# Patient Record
Sex: FEMALE | Race: WHITE | NOT HISPANIC OR LATINO | Employment: UNEMPLOYED | URBAN - METROPOLITAN AREA
[De-identification: names, ages, dates, MRNs, and addresses within clinical notes are randomized per-mention and may not be internally consistent; named-entity substitution may affect disease eponyms.]

---

## 2017-07-31 ENCOUNTER — TELEPHONE (OUTPATIENT)
Dept: OBSTETRICS AND GYNECOLOGY | Facility: CLINIC | Age: 32
End: 2017-07-31

## 2017-07-31 NOTE — TELEPHONE ENCOUNTER
----- Message from [place] sent at 7/31/2017 12:13 PM CDT -----  Contact: [phone]  Patient has medicaid and would like to see dr. Gilbert for a well woman visit

## 2017-07-31 NOTE — TELEPHONE ENCOUNTER
Pt notified that Dr. Gilbert does not see patients who are not pregnant that have medicaid. Pt has been scheduled with Dr. Olsen

## 2017-08-11 ENCOUNTER — TELEPHONE (OUTPATIENT)
Dept: OBSTETRICS AND GYNECOLOGY | Facility: CLINIC | Age: 32
End: 2017-08-11

## 2017-08-11 NOTE — TELEPHONE ENCOUNTER
----- Message from Priscilla Domínguez MA sent at 8/11/2017  9:14 AM CDT -----  Contact: [phone]/ self       ----- Message -----  From: Freda Luz  Sent: 8/11/2017   9:08 AM  To: [facility] Staff    Patient called in returning your call. Please advise

## 2019-04-10 ENCOUNTER — HOSPITAL ENCOUNTER (EMERGENCY)
Facility: HOSPITAL | Age: 34
Discharge: HOME | End: 2019-04-10
Attending: EMERGENCY MEDICINE
Payer: COMMERCIAL

## 2019-04-10 ENCOUNTER — TELEPHONE (OUTPATIENT)
Dept: ORTHOPEDICS | Facility: CLINIC | Age: 34
End: 2019-04-10

## 2019-04-10 VITALS
HEART RATE: 86 BPM | WEIGHT: 140 LBS | BODY MASS INDEX: 25.76 KG/M2 | HEIGHT: 62 IN | TEMPERATURE: 98 F | SYSTOLIC BLOOD PRESSURE: 108 MMHG | OXYGEN SATURATION: 98 % | RESPIRATION RATE: 16 BRPM | DIASTOLIC BLOOD PRESSURE: 52 MMHG

## 2019-04-10 DIAGNOSIS — M71.122 SEPTIC OLECRANON BURSITIS OF LEFT ELBOW: Primary | ICD-10-CM

## 2019-04-10 DIAGNOSIS — Z72.0 TOBACCO USE: ICD-10-CM

## 2019-04-10 DIAGNOSIS — M25.522 ELBOW PAIN, LEFT: ICD-10-CM

## 2019-04-10 LAB
ANION GAP SERPL CALC-SCNC: 8 MMOL/L (ref 8–16)
BASOPHILS # BLD AUTO: 0.07 K/UL (ref 0–0.2)
BASOPHILS NFR BLD: 0.6 % (ref 0–1.9)
BUN SERPL-MCNC: 12 MG/DL (ref 6–20)
CALCIUM SERPL-MCNC: 9.4 MG/DL (ref 8.7–10.5)
CHLORIDE SERPL-SCNC: 109 MMOL/L (ref 95–110)
CO2 SERPL-SCNC: 21 MMOL/L (ref 23–29)
CREAT SERPL-MCNC: 0.7 MG/DL (ref 0.5–1.4)
CRP SERPL-MCNC: 2.3 MG/L (ref 0–8.2)
DIFFERENTIAL METHOD: ABNORMAL
EOSINOPHIL # BLD AUTO: 0.1 K/UL (ref 0–0.5)
EOSINOPHIL NFR BLD: 0.6 % (ref 0–8)
ERYTHROCYTE [DISTWIDTH] IN BLOOD BY AUTOMATED COUNT: 13.2 % (ref 11.5–14.5)
ERYTHROCYTE [SEDIMENTATION RATE] IN BLOOD BY WESTERGREN METHOD: 5 MM/HR (ref 0–36)
EST. GFR  (AFRICAN AMERICAN): >60 ML/MIN/1.73 M^2
EST. GFR  (NON AFRICAN AMERICAN): >60 ML/MIN/1.73 M^2
GLUCOSE SERPL-MCNC: 92 MG/DL (ref 70–110)
HCT VFR BLD AUTO: 41.2 % (ref 37–48.5)
HGB BLD-MCNC: 13.5 G/DL (ref 12–16)
IMM GRANULOCYTES # BLD AUTO: 0.06 K/UL (ref 0–0.04)
IMM GRANULOCYTES NFR BLD AUTO: 0.5 % (ref 0–0.5)
LYMPHOCYTES # BLD AUTO: 3.1 K/UL (ref 1–4.8)
LYMPHOCYTES NFR BLD: 24.8 % (ref 18–48)
MCH RBC QN AUTO: 29.9 PG (ref 27–31)
MCHC RBC AUTO-ENTMCNC: 32.8 G/DL (ref 32–36)
MCV RBC AUTO: 91 FL (ref 82–98)
MONOCYTES # BLD AUTO: 1 K/UL (ref 0.3–1)
MONOCYTES NFR BLD: 8.1 % (ref 4–15)
NEUTROPHILS # BLD AUTO: 8.2 K/UL (ref 1.8–7.7)
NEUTROPHILS NFR BLD: 65.4 % (ref 38–73)
NRBC BLD-RTO: 0 /100 WBC
PLATELET # BLD AUTO: 318 K/UL (ref 150–350)
PMV BLD AUTO: 11.3 FL (ref 9.2–12.9)
POTASSIUM SERPL-SCNC: 3.6 MMOL/L (ref 3.5–5.1)
RBC # BLD AUTO: 4.52 M/UL (ref 4–5.4)
SODIUM SERPL-SCNC: 138 MMOL/L (ref 136–145)
WBC # BLD AUTO: 12.51 K/UL (ref 3.9–12.7)

## 2019-04-10 PROCEDURE — 99284 EMERGENCY DEPT VISIT MOD MDM: CPT | Mod: 25,,, | Performed by: PHYSICIAN ASSISTANT

## 2019-04-10 PROCEDURE — 80048 BASIC METABOLIC PNL TOTAL CA: CPT

## 2019-04-10 PROCEDURE — 85652 RBC SED RATE AUTOMATED: CPT

## 2019-04-10 PROCEDURE — 86140 C-REACTIVE PROTEIN: CPT

## 2019-04-10 PROCEDURE — 99284 PR EMERGENCY DEPT VISIT,LEVEL IV: ICD-10-PCS | Mod: 25,,, | Performed by: PHYSICIAN ASSISTANT

## 2019-04-10 PROCEDURE — 99406 PR TOBACCO USE CESSATION INTERMEDIATE 3-10 MINUTES: ICD-10-PCS | Mod: ,,, | Performed by: PHYSICIAN ASSISTANT

## 2019-04-10 PROCEDURE — 99284 EMERGENCY DEPT VISIT MOD MDM: CPT

## 2019-04-10 PROCEDURE — 85025 COMPLETE CBC W/AUTO DIFF WBC: CPT

## 2019-04-10 PROCEDURE — 99406 BEHAV CHNG SMOKING 3-10 MIN: CPT | Mod: ,,, | Performed by: PHYSICIAN ASSISTANT

## 2019-04-10 RX ORDER — CLINDAMYCIN HYDROCHLORIDE 150 MG/1
300 CAPSULE ORAL
Status: DISCONTINUED | OUTPATIENT
Start: 2019-04-10 | End: 2019-04-10 | Stop reason: HOSPADM

## 2019-04-10 RX ORDER — KETOROLAC TROMETHAMINE 10 MG/1
10 TABLET, FILM COATED ORAL
Status: DISCONTINUED | OUTPATIENT
Start: 2019-04-10 | End: 2019-04-10 | Stop reason: HOSPADM

## 2019-04-10 RX ORDER — NABUMETONE 500 MG/1
500 TABLET, FILM COATED ORAL 2 TIMES DAILY PRN
Qty: 14 TABLET | Refills: 0 | Status: SHIPPED | OUTPATIENT
Start: 2019-04-10 | End: 2019-04-17

## 2019-04-10 RX ORDER — CLINDAMYCIN HYDROCHLORIDE 300 MG/1
300 CAPSULE ORAL EVERY 8 HOURS
Qty: 21 CAPSULE | Refills: 0 | Status: SHIPPED | OUTPATIENT
Start: 2019-04-10

## 2019-04-10 NOTE — ED TRIAGE NOTES
Ivania King, a 33 y.o. female presents to the ED w/ complaint of left elbow pain. Left elbow is warm, red and edematous. Pt denies any recent trauma. Pt denies any fever.     Triage note:  Chief Complaint   Patient presents with    Elbow Pain     Presents to ED c/o redness, swelling, and pain to L elbow that she first noticed this morning. Denies known injury      Review of patient's allergies indicates:  No Known Allergies  No past medical history on file.

## 2019-04-10 NOTE — TELEPHONE ENCOUNTER
Left a voicemail for patient to return my phone call in regards to scheduling an appointment at their convenience. Provided the clinic's phone number.

## 2019-04-10 NOTE — ED NOTES
"Pt SO presents to nurses station wanted to go eat with pt in cafe.  I advised we do not like pt's to leave the area, althought they have already done that to go smoke.  Pt SO other states "well I don't want her to leave and she will".  I report that I cannot make her stay she is an adult and can decide that for herself.  He states "we are going to the bathroom, I'll let you know when we get back"  "

## 2019-04-10 NOTE — ED PROVIDER NOTES
Encounter Date: 4/10/2019       History     Chief Complaint   Patient presents with    Elbow Pain     Presents to ED c/o redness, swelling, and pain to L elbow that she first noticed this morning. Denies known injury      The patient presents to the ER for an emergent evaluation due to acute pain, redness, and swelling to the posterior aspect of her left elbow. She states that the pain began gradually last night, and this morning she discovered the redness and swelling. She denies any known injury or specific trauma to the elbow. She denies any decreased ROM of the elbow. She is right handed. 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 She denies any known break in the skin. She denies previous episodes. She states that the degree of pain is moderate. She states that the pain course is constant. She states that her posterior elbow is very tender to palpation and worse with full flexion. She denies any fever or chills. She denies any additional symptoms or concerns. No pre-arrival treatment.         Review of patient's allergies indicates:  No Known Allergies  History reviewed. No pertinent past medical history.  Past Surgical History:   Procedure Laterality Date    ANKLE SURGERY      SKIN GRAFT      on leg    SKIN GRAPH ON LEG      TUBAL LIGATION       History reviewed. No pertinent family history.  Social History     Tobacco Use    Smoking status: Current Every Day Smoker     Packs/day: 1.00    Smokeless tobacco: Never Used   Substance Use Topics    Alcohol use: No    Drug use: No     Review of Systems   Constitutional: Negative for chills and fever.   Respiratory: Negative for shortness of breath.    Cardiovascular: Negative for chest pain.   Gastrointestinal: Negative for abdominal pain, diarrhea, nausea and vomiting.   Endocrine: Negative for polydipsia and polyuria.   Genitourinary: Negative for menstrual problem.   Musculoskeletal:  Positive for arthralgias and joint swelling. Negative for back pain and myalgias.   Skin: Positive for color change. Negative for rash and wound.   Allergic/Immunologic: Negative for immunocompromised state.   Neurological: Negative for dizziness, syncope, weakness, light-headedness and headaches.   Psychiatric/Behavioral: Negative for confusion.       Physical Exam     Initial Vitals [04/10/19 0619]   BP Pulse Resp Temp SpO2   113/66 80 16 98.3 °F (36.8 °C) 98 %      MAP       --         Physical Exam    Nursing note and vitals reviewed.  Constitutional: She appears well-developed and well-nourished. She is not diaphoretic.   HENT:   Head: Normocephalic.   Mouth/Throat: Oropharynx is clear and moist.   Eyes: Conjunctivae are normal.   Cardiovascular: Normal rate, regular rhythm and intact distal pulses.   Pulmonary/Chest: Breath sounds normal. No respiratory distress.   Abdominal: Soft. There is no tenderness.   Musculoskeletal: Normal range of motion.   Neurological: She is alert and oriented to person, place, and time. She has normal strength. No sensory deficit.   Skin: Skin is warm and dry.   Psychiatric: She has a normal mood and affect. Her behavior is normal.             ED Course   Procedures  Labs Reviewed   CBC W/ AUTO DIFFERENTIAL - Abnormal; Notable for the following components:       Result Value    Gran # (ANC) 8.2 (*)     Immature Grans (Abs) 0.06 (*)     All other components within normal limits   BASIC METABOLIC PANEL - Abnormal; Notable for the following components:    CO2 21 (*)     All other components within normal limits   SEDIMENTATION RATE   C-REACTIVE PROTEIN     Results for orders placed or performed during the hospital encounter of 04/10/19   CBC auto differential   Result Value Ref Range    WBC 12.51 3.90 - 12.70 K/uL    RBC 4.52 4.00 - 5.40 M/uL    Hemoglobin 13.5 12.0 - 16.0 g/dL    Hematocrit 41.2 37.0 - 48.5 %    MCV 91 82 - 98 fL    MCH 29.9 27.0 - 31.0 pg    MCHC 32.8 32.0 - 36.0  g/dL    RDW 13.2 11.5 - 14.5 %    Platelets 318 150 - 350 K/uL    MPV 11.3 9.2 - 12.9 fL    Immature Granulocytes 0.5 0.0 - 0.5 %    Gran # (ANC) 8.2 (H) 1.8 - 7.7 K/uL    Immature Grans (Abs) 0.06 (H) 0.00 - 0.04 K/uL    Lymph # 3.1 1.0 - 4.8 K/uL    Mono # 1.0 0.3 - 1.0 K/uL    Eos # 0.1 0.0 - 0.5 K/uL    Baso # 0.07 0.00 - 0.20 K/uL    nRBC 0 0 /100 WBC    Gran% 65.4 38.0 - 73.0 %    Lymph% 24.8 18.0 - 48.0 %    Mono% 8.1 4.0 - 15.0 %    Eosinophil% 0.6 0.0 - 8.0 %    Basophil% 0.6 0.0 - 1.9 %    Differential Method Automated    Basic metabolic panel   Result Value Ref Range    Sodium 138 136 - 145 mmol/L    Potassium 3.6 3.5 - 5.1 mmol/L    Chloride 109 95 - 110 mmol/L    CO2 21 (L) 23 - 29 mmol/L    Glucose 92 70 - 110 mg/dL    BUN, Bld 12 6 - 20 mg/dL    Creatinine 0.7 0.5 - 1.4 mg/dL    Calcium 9.4 8.7 - 10.5 mg/dL    Anion Gap 8 8 - 16 mmol/L    eGFR if African American >60.0 >60 mL/min/1.73 m^2    eGFR if non African American >60.0 >60 mL/min/1.73 m^2   Sedimentation rate   Result Value Ref Range    Sed Rate 5 0 - 36 mm/Hr   C-reactive protein   Result Value Ref Range    CRP 2.3 0.0 - 8.2 mg/L            Imaging Results          X-Ray Elbow Complete Left (Final result)  Result time 04/10/19 08:31:55    Final result by Bienvenido Garrett MD (04/10/19 08:31:55)                 Impression:      See above      Electronically signed by: Bienvenido Garrett MD  Date:    04/10/2019  Time:    08:31             Narrative:    EXAMINATION:  XR ELBOW COMPLETE 3 VIEW LEFT    CLINICAL HISTORY:  Pain in left elbow    TECHNIQUE:  AP, lateral, and oblique views of the left elbow were performed.    COMPARISON:  None    FINDINGS:  No fracture or dislocation.  No bone destruction identified                                 Medical Decision Making:   History:   Old Medical Records: I decided to obtain old medical records.  Initial Assessment:   Acute pain, swelling, and erythema to posterior aspect of left elbow.   Differential Diagnosis:    Bursitis, septic bursitis, abscess, gout, septic joint, effusion, etc   Clinical Tests:   Lab Tests: Ordered and Reviewed  Radiological Study: Ordered and Reviewed  ED Management:  I discussed the case in detail with the ER attending physician   I discussed the case in detail with orthopedics, to determine indication for emergent aspiration or drainage of infected bursa. Ortho recommended antibiotic treatment w/o drainage initially and if unimproved, to consider drainage.   Clindamycin given in the ER and prescribed.   Pt advised to follow up with orthopedics   Pt advised to return to the ER if worse in any way     Additional MDM:   Smoking Cessation: The patient is a smoker. The patient was counseled on smoking cessation for: 3 minutes. The patient was counseled on tobacco related  health complications. Appropriate patient literature was given to the patient concerning tobacco cessation.   X-Rays: I have independently interpreted X-Ray(s) - see notes.            Attending Attestation:     Physician Attestation Statement for NP/PA:       Other NP/PA Attestation Additions:    History of Present Illness: 32 y/o f complains left elbow swelling, pain and redness.                      Clinical Impression:       ICD-10-CM ICD-9-CM   1. Septic olecranon bursitis of left elbow M71.122 726.33     041.9   2. Elbow pain, left M25.522 719.42   3. Tobacco use Z72.0 305.1         Disposition:   Disposition: Discharged  Condition: Stable                        Jean Pierre Ulloa PA-C  04/10/19 0857

## 2019-04-11 ENCOUNTER — TELEPHONE (OUTPATIENT)
Dept: ORTHOPEDICS | Facility: CLINIC | Age: 34
End: 2019-04-11

## 2019-04-11 NOTE — TELEPHONE ENCOUNTER
Spoke pt and informed her I would need to schedule her to go to [facility] due to them specilazing in the upper extremities (her elbow), pt understood and accepted the appt on 4-17-19 at 1pm.

## 2019-04-16 ENCOUNTER — TELEPHONE (OUTPATIENT)
Dept: ORTHOPEDICS | Facility: CLINIC | Age: 34
End: 2019-04-16

## 2019-04-28 ENCOUNTER — HOSPITAL ENCOUNTER (EMERGENCY)
Facility: HOSPITAL | Age: 34
Discharge: HOME | End: 2019-04-29
Attending: EMERGENCY MEDICINE
Payer: COMMERCIAL

## 2019-04-28 DIAGNOSIS — N23 RENAL COLIC ON LEFT SIDE: Primary | ICD-10-CM

## 2019-04-28 LAB
B-HCG UR QL: NEGATIVE
BASOPHILS # BLD AUTO: 0.03 K/UL (ref 0–0.2)
BASOPHILS NFR BLD: 0.2 % (ref 0–1.9)
CTP QC/QA: YES
DIFFERENTIAL METHOD: ABNORMAL
EOSINOPHIL # BLD AUTO: 0 K/UL (ref 0–0.5)
EOSINOPHIL NFR BLD: 0.2 % (ref 0–8)
ERYTHROCYTE [DISTWIDTH] IN BLOOD BY AUTOMATED COUNT: 13.1 % (ref 11.5–14.5)
HCT VFR BLD AUTO: 43.3 % (ref 37–48.5)
HGB BLD-MCNC: 14.1 G/DL (ref 12–16)
LYMPHOCYTES # BLD AUTO: 3.1 K/UL (ref 1–4.8)
LYMPHOCYTES NFR BLD: 19.5 % (ref 18–48)
MCH RBC QN AUTO: 29.7 PG (ref 27–31)
MCHC RBC AUTO-ENTMCNC: 32.6 G/DL (ref 32–36)
MCV RBC AUTO: 91 FL (ref 82–98)
MONOCYTES # BLD AUTO: 1.1 K/UL (ref 0.3–1)
MONOCYTES NFR BLD: 6.7 % (ref 4–15)
NEUTROPHILS # BLD AUTO: 11.7 K/UL (ref 1.8–7.7)
NEUTROPHILS NFR BLD: 73.2 % (ref 38–73)
PLATELET # BLD AUTO: 381 K/UL (ref 150–350)
PMV BLD AUTO: 10.3 FL (ref 9.2–12.9)
RBC # BLD AUTO: 4.74 M/UL (ref 4–5.4)
WBC # BLD AUTO: 16.01 K/UL (ref 3.9–12.7)

## 2019-04-28 PROCEDURE — 81000 URINALYSIS NONAUTO W/SCOPE: CPT

## 2019-04-28 PROCEDURE — 63600175 PHARM REV CODE 636 W HCPCS: Performed by: EMERGENCY MEDICINE

## 2019-04-28 PROCEDURE — 25000003 PHARM REV CODE 250: Performed by: EMERGENCY MEDICINE

## 2019-04-28 PROCEDURE — 96361 HYDRATE IV INFUSION ADD-ON: CPT

## 2019-04-28 PROCEDURE — 99285 EMERGENCY DEPT VISIT HI MDM: CPT | Mod: 25

## 2019-04-28 PROCEDURE — 96374 THER/PROPH/DIAG INJ IV PUSH: CPT

## 2019-04-28 PROCEDURE — 85025 COMPLETE CBC W/AUTO DIFF WBC: CPT

## 2019-04-28 PROCEDURE — 81025 URINE PREGNANCY TEST: CPT | Performed by: EMERGENCY MEDICINE

## 2019-04-28 PROCEDURE — 80053 COMPREHEN METABOLIC PANEL: CPT

## 2019-04-28 PROCEDURE — 96375 TX/PRO/DX INJ NEW DRUG ADDON: CPT

## 2019-04-28 RX ORDER — ONDANSETRON 2 MG/ML
4 INJECTION INTRAMUSCULAR; INTRAVENOUS
Status: COMPLETED | OUTPATIENT
Start: 2019-04-28 | End: 2019-04-28

## 2019-04-28 RX ORDER — KETOROLAC TROMETHAMINE 30 MG/ML
10 INJECTION, SOLUTION INTRAMUSCULAR; INTRAVENOUS
Status: COMPLETED | OUTPATIENT
Start: 2019-04-28 | End: 2019-04-28

## 2019-04-28 RX ADMIN — SODIUM CHLORIDE 1000 ML: 0.9 INJECTION, SOLUTION INTRAVENOUS at 11:04

## 2019-04-28 RX ADMIN — ONDANSETRON 4 MG: 2 INJECTION INTRAMUSCULAR; INTRAVENOUS at 11:04

## 2019-04-28 RX ADMIN — KETOROLAC TROMETHAMINE 10 MG: 30 INJECTION, SOLUTION INTRAMUSCULAR at 11:04

## 2019-04-29 VITALS
SYSTOLIC BLOOD PRESSURE: 96 MMHG | OXYGEN SATURATION: 98 % | WEIGHT: 140 LBS | BODY MASS INDEX: 25.76 KG/M2 | TEMPERATURE: 98 F | HEIGHT: 62 IN | HEART RATE: 69 BPM | DIASTOLIC BLOOD PRESSURE: 51 MMHG | RESPIRATION RATE: 14 BRPM

## 2019-04-29 LAB
ALBUMIN SERPL BCP-MCNC: 4.1 G/DL (ref 3.5–5.2)
ALP SERPL-CCNC: 74 U/L (ref 55–135)
ALT SERPL W/O P-5'-P-CCNC: 18 U/L (ref 10–44)
ANION GAP SERPL CALC-SCNC: 11 MMOL/L (ref 8–16)
AST SERPL-CCNC: 16 U/L (ref 10–40)
BACTERIA #/AREA URNS HPF: ABNORMAL /HPF
BILIRUB SERPL-MCNC: 0.2 MG/DL (ref 0.1–1)
BILIRUB UR QL STRIP: NEGATIVE
BUN SERPL-MCNC: 18 MG/DL (ref 6–20)
CALCIUM SERPL-MCNC: 9.8 MG/DL (ref 8.7–10.5)
CHLORIDE SERPL-SCNC: 107 MMOL/L (ref 95–110)
CLARITY UR: ABNORMAL
CO2 SERPL-SCNC: 23 MMOL/L (ref 23–29)
COLOR UR: YELLOW
CREAT SERPL-MCNC: 0.9 MG/DL (ref 0.5–1.4)
EST. GFR  (AFRICAN AMERICAN): >60 ML/MIN/1.73 M^2
EST. GFR  (NON AFRICAN AMERICAN): >60 ML/MIN/1.73 M^2
GLUCOSE SERPL-MCNC: 135 MG/DL (ref 70–110)
GLUCOSE UR QL STRIP: NEGATIVE
GRAN CASTS #/AREA URNS LPF: 1 /LPF
HGB UR QL STRIP: ABNORMAL
KETONES UR QL STRIP: ABNORMAL
LEUKOCYTE ESTERASE UR QL STRIP: ABNORMAL
MICROSCOPIC COMMENT: ABNORMAL
NITRITE UR QL STRIP: NEGATIVE
PH UR STRIP: 6 [PH] (ref 5–8)
POTASSIUM SERPL-SCNC: 3.5 MMOL/L (ref 3.5–5.1)
PROT SERPL-MCNC: 7.6 G/DL (ref 6–8.4)
PROT UR QL STRIP: NEGATIVE
RBC #/AREA URNS HPF: >100 /HPF (ref 0–4)
SODIUM SERPL-SCNC: 141 MMOL/L (ref 136–145)
SP GR UR STRIP: >=1.03 (ref 1–1.03)
SQUAMOUS #/AREA URNS HPF: 4 /HPF
URN SPEC COLLECT METH UR: ABNORMAL
UROBILINOGEN UR STRIP-ACNC: NEGATIVE EU/DL
WBC #/AREA URNS HPF: 4 /HPF (ref 0–5)

## 2019-04-29 NOTE — ED NOTES
Patient has L sided flank pain that started two hours ago. Patient complains of trouble peeing, burning with urination, L flank pain, nausea, and pressure in her rectum and vagina.

## 2019-04-29 NOTE — ED PROVIDER NOTES
Encounter Date: 4/28/2019    SCRIBE #1 NOTE: I, Gautam Elisa, am scribing for, and in the presence of,  Dr. Cabezas. I have scribed the entire note.       History     Chief Complaint   Patient presents with    Flank Pain     left flank pain that radiates down to perineal area. Reports unable to urinate, last urination at 8pm. pt restless.      Time seen by provider: 11:11 PM    This is a 33 y.o. female who presents with complaint of left flank pain x 2 hours. Patient states that her pain radiates down to the perineal area, and she has been unable to urinate since 2000. She reports associated nausea and vomiting. On arrival, the patient is moaning in pain and appears in distress. Patient does not report any other concerning symptoms. She has no prior history of similar symptoms or kidney stones. Patient denies any prior history of HTN, DM, or other medical issues.    The history is provided by the patient.     Review of patient's allergies indicates:  No Known Allergies  No past medical history on file.  Past Surgical History:   Procedure Laterality Date    ANKLE SURGERY      SKIN GRAFT      on leg    SKIN GRAPH ON LEG      TUBAL LIGATION       No family history on file.  Social History     Tobacco Use    Smoking status: Current Every Day Smoker     Packs/day: 1.00    Smokeless tobacco: Never Used   Substance Use Topics    Alcohol use: No    Drug use: No     Review of Systems   Gastrointestinal: Positive for nausea and vomiting.   Genitourinary: Positive for flank pain.   All other systems reviewed and are negative.    Physical Exam     Initial Vitals [04/28/19 2308]   BP Pulse Resp Temp SpO2   122/64 99 (!) 24 97.7 °F (36.5 °C) 100 %      MAP       --         Physical Exam    Nursing note and vitals reviewed.  Constitutional: She appears well-developed and well-nourished. She is not diaphoretic. She appears distressed.   Distressed, in obvious pain  Crying   HENT:   Head: Normocephalic and atraumatic.   Eyes:  Pupils are equal, round, and reactive to light.   Cardiovascular: Normal rate, regular rhythm and normal heart sounds.   Pulmonary/Chest: Breath sounds normal. No respiratory distress.   Abdominal: Soft. There is no tenderness.   Genitourinary:   Genitourinary Comments:  exam deferred   Musculoskeletal: Normal range of motion. She exhibits no edema or tenderness.   No CVA TTP   Neurological: She is alert and oriented to person, place, and time. She has normal strength.   Skin: Skin is warm and dry. No rash noted.   No rash       ED Course   Procedures  Labs Reviewed   CBC W/ AUTO DIFFERENTIAL - Abnormal; Notable for the following components:       Result Value    WBC 16.01 (*)     Platelets 381 (*)     Gran # (ANC) 11.7 (*)     Mono # 1.1 (*)     Gran% 73.2 (*)     All other components within normal limits   COMPREHENSIVE METABOLIC PANEL - Abnormal; Notable for the following components:    Glucose 135 (*)     All other components within normal limits   URINALYSIS, REFLEX TO URINE CULTURE - Abnormal; Notable for the following components:    Appearance, UA Hazy (*)     Specific Gravity, UA >=1.030 (*)     Ketones, UA 1+ (*)     Occult Blood UA 3+ (*)     Leukocytes, UA Trace (*)     All other components within normal limits    Narrative:     Preferred Collection Type->Urine, Clean Catch   URINALYSIS MICROSCOPIC - Abnormal; Notable for the following components:    RBC, UA >100 (*)     Bacteria, UA Few (*)     Granular Casts, UA 1 (*)     All other components within normal limits    Narrative:     Preferred Collection Type->Urine, Clean Catch   POCT URINE PREGNANCY          X-Rays:   Independently Interpreted Readings:   Other Readings:  Reviewed by myself, read by radiology.    Imaging Results          CT Renal Stone Study ABD Pelvis WO (Final result)  Result time 04/28/19 23:56:03    Final result by Kendall Blakely MD (04/28/19 23:56:03)                 Impression:      Moderate left-sided hydroureteronephrosis with no  identifiable stone along the course of the left ureter.  The findings may represent recently passed stone in the left collecting system.  No evidence of nephrolithiasis.    Scattered colonic diverticula without evidence of acute diverticulitis.    Abnormal trabeculation involving the left iliac bone.  Suggest correlation with prior examinations.      Electronically signed by: Kendall Blakely MD  Date:    04/28/2019  Time:    23:56             Narrative:    EXAMINATION:  CT RENAL STONE STUDY ABD PELVIS WO    CLINICAL HISTORY:  Flank pain, stone disease suspected;    TECHNIQUE:  Axial CT scan of the abdomen and pelvis was obtained from the level of the hemidiaphragms to the pubic symphysis without intravenous contrast. Coronal and sagittal reformats were obtained. The study was performed using a renal stone protocol.  Therefore, no intravenous or oral IV contrast was administered.    COMPARISON:  None.    FINDINGS:  There are no pleural effusions.  There is no evidence of a pneumothorax.  No airspace opacity is present.  No discrete pulmonary nodules identified.    The heart is unremarkable.  There is normal tapering of the abdominal aorta.  Noncontrast meant of the aortic branch vessels are within normal limits.  There is no evidence of lymphadenopathy in the abdomen or pelvis.    The esophagus, stomach, and duodenum are within normal limits.  The small bowel loops are unremarkable.  The appendix is within normal limits.  There is scattered colonic diverticula without evidence of acute diverticulitis.  There is no evidence of bowel obstruction.    The liver is unremarkable.  The gallbladder is within normal limits.  The biliary tree is unremarkable.  The spleen is unremarkable.  The pancreas is within normal limits.    The adrenal glands are unremarkable.  There is no evidence of nephrolithiasis.  The right ureter is unremarkable.  There is left-sided perinephric stranding.  There is moderate left-sided  hydroureteronephrosis..  No stone is identified along the course of the left ureter.  The urinary bladder is within normal limits.  The uterus and adnexal structures are within normal limits.    There is no evidence of free fluid in the abdomen or pelvis.  There is no evidence of free air.  There is no evidence of pneumatosis.  No portal venous air is identified.    The psoas margins are unremarkable.  The abdominal wall is within normal limits.  There is abnormal trabeculation involving the left iliac bone the level of the sacroiliac joint.  There is no evidence of a fracture.                              Medical Decision Making:   Initial Assessment:   Patient has acute severe flank pain, likely kidney stone.  My differential includes muscle pain, ovarian cyst or torsion, ectopic pregnancy.  Clinical Tests:   Lab Tests: Ordered and Reviewed  Radiological Study: Ordered and Reviewed  ED Management:  12:49 AM  Patient's pain is completely resolved after Toradol. She has hydronephrosis on CT without any obvious stone which is likely secondary to a passed kidney stone. She can be discharged, and I recommended NSAIDs as needed. She will most likely have minimal symptoms, but will return if worse.                      Clinical Impression:       ICD-10-CM ICD-9-CM   1. Renal colic on left side N23 788.0     Disposition:   Disposition: Discharged  Condition: Stable    I, Dr. Scott Cabezas, personally performed the services described in this documentation. All medical record entries made by the scribe were at my direction and in my presence.  I have reviewed the chart and agree that the record reflects my personal performance and is accurate and complete. Scott Cabezas MD.     Terrell Cabezas MD  04/29/19 7521

## 2019-04-29 NOTE — ED NOTES
"Patient very anxious " I dont want to get medicine that is going ot stop my heart." RN explained this medication will not do that.  stating to patient " this medicine is not like the medicine you get from [facility] down the street."   "

## 2019-05-03 ENCOUNTER — TELEPHONE (OUTPATIENT)
Dept: INTERNAL MEDICINE | Facility: CLINIC | Age: 34
End: 2019-05-03

## 2019-09-01 ENCOUNTER — HOSPITAL ENCOUNTER (EMERGENCY)
Facility: HOSPITAL | Age: 34
Discharge: HOME | End: 2019-09-01
Attending: EMERGENCY MEDICINE
Payer: COMMERCIAL

## 2019-09-01 VITALS
HEART RATE: 80 BPM | HEIGHT: 62 IN | BODY MASS INDEX: 25.76 KG/M2 | SYSTOLIC BLOOD PRESSURE: 120 MMHG | OXYGEN SATURATION: 98 % | TEMPERATURE: 98 F | WEIGHT: 140 LBS | RESPIRATION RATE: 15 BRPM | DIASTOLIC BLOOD PRESSURE: 60 MMHG

## 2019-09-01 DIAGNOSIS — H69.91 EUSTACHIAN TUBE DYSFUNCTION, RIGHT: Primary | ICD-10-CM

## 2019-09-01 LAB
B-HCG UR QL: NEGATIVE
CTP QC/QA: YES

## 2019-09-01 PROCEDURE — 99282 EMERGENCY DEPT VISIT SF MDM: CPT | Mod: 25

## 2019-09-01 PROCEDURE — 81025 URINE PREGNANCY TEST: CPT | Performed by: EMERGENCY MEDICINE

## 2019-09-01 NOTE — ED NOTES
Pt presents to ED c/o right ear pain X 3-4 days. Pt reports that mother informed pt of drainage from right ear, no drainage noted at present. Pt reports due to problems with insurance has not seen PCP and does not have PCP. Pt reports taking ibuprofen and other OTC medications with no pain relief.

## 2019-09-01 NOTE — ED PROVIDER NOTES
Encounter Date: 9/1/2019    SCRIBE #1 NOTE: I, Joi Jade, am scribing for, and in the presence of,  Dr. Cabezas. I have scribed the entire note.       History     Chief Complaint   Patient presents with    Otalgia     pain X3-4 days to right ear. did not see PCP for problem. reports drainage seen by mother.      Time seen by provider: 3:35 AM    This is a 34 y.o. female who presents with complaint of an right ear pain for 3 days. She states the pain radiates down to her jaw, and is concerned she may have an ear infection. She has had no relief from peroxide rinses, Ibuprofen, or Tylenol. Patient is positive for cough and nausea, but denies vomiting and fever.     The history is provided by the patient.     Review of patient's allergies indicates:  No Known Allergies  No past medical history on file.  Past Surgical History:   Procedure Laterality Date    ANKLE SURGERY      SKIN GRAFT      on leg    SKIN GRAPH ON LEG      TUBAL LIGATION       No family history on file.  Social History     Tobacco Use    Smoking status: Current Every Day Smoker     Packs/day: 1.00    Smokeless tobacco: Never Used   Substance Use Topics    Alcohol use: No    Drug use: No     Review of Systems   Constitutional: Negative for fever.   HENT: Positive for ear pain.    Respiratory: Positive for cough.    Gastrointestinal: Positive for nausea. Negative for vomiting.   All other systems reviewed and are negative.      Physical Exam     Initial Vitals [09/01/19 0220]   BP Pulse Resp Temp SpO2   (!) 117/59 82 15 98.3 °F (36.8 °C) 98 %      MAP       --         Physical Exam    Nursing note and vitals reviewed.  Constitutional: She appears well-developed and well-nourished.   HENT:   Right Ear: Tympanic membrane and external ear normal.   Mouth/Throat: Oropharynx is clear and moist.   Right ear: normal EAC and TM; no discharge, no cellulitis  Oropharynx clear   Neurological: She is alert and oriented to person, place, and time.   Skin:  Skin is warm and dry.         ED Course   Procedures  Labs Reviewed   POCT URINE PREGNANCY          Imaging Results    None          Medical Decision Making:   Initial Assessment:   Th patient was concerned for an ear infection. However, her ear is normal. This is likely eustacian tube dysfunction related to her recent URI. I recommended pseudoephedrine; there is no indication for antibiotics.  Clinical Tests:   Lab Tests: Ordered and Reviewed                      Clinical Impression:       ICD-10-CM ICD-9-CM   1. Eustachian tube dysfunction, right H69.81 381.81         Disposition:   Disposition: Discharged  Condition: Stable    I, Dr. Scott Cabezas, personally performed the services described in this documentation. All medical record entries made by the scribe were at my direction and in my presence.  I have reviewed the chart and agree that the record reflects my personal performance and is accurate and complete. Scott Cabezas MD.                    Terrell Cabezas MD  09/01/19 0695

## 2020-01-03 ENCOUNTER — HOSPITAL ENCOUNTER (EMERGENCY)
Facility: HOSPITAL | Age: 35
Discharge: HOME | End: 2020-01-03
Attending: EMERGENCY MEDICINE
Payer: MEDICAID

## 2020-01-03 VITALS
TEMPERATURE: 98 F | OXYGEN SATURATION: 100 % | SYSTOLIC BLOOD PRESSURE: 120 MMHG | RESPIRATION RATE: 18 BRPM | HEART RATE: 80 BPM | BODY MASS INDEX: 28.35 KG/M2 | WEIGHT: 155 LBS | DIASTOLIC BLOOD PRESSURE: 60 MMHG

## 2020-01-03 DIAGNOSIS — L02.214 GROIN ABSCESS: Primary | ICD-10-CM

## 2020-01-03 LAB
B-HCG UR QL: NEGATIVE
CTP QC/QA: YES

## 2020-01-03 PROCEDURE — 81025 URINE PREGNANCY TEST: CPT | Performed by: NURSE PRACTITIONER

## 2020-01-03 PROCEDURE — 25000003 PHARM REV CODE 250: Performed by: NURSE PRACTITIONER

## 2020-01-03 PROCEDURE — 99283 EMERGENCY DEPT VISIT LOW MDM: CPT | Mod: 25

## 2020-01-03 PROCEDURE — 10060 I&D ABSCESS SIMPLE/SINGLE: CPT

## 2020-01-03 RX ORDER — LIDOCAINE HYDROCHLORIDE 10 MG/ML
10 INJECTION INFILTRATION; PERINEURAL
Status: COMPLETED | OUTPATIENT
Start: 2020-01-03 | End: 2020-01-03

## 2020-01-03 RX ORDER — TRAMADOL HYDROCHLORIDE 50 MG/1
50 TABLET ORAL EVERY 6 HOURS PRN
Qty: 12 TABLET | Refills: 0 | Status: SHIPPED | OUTPATIENT
Start: 2020-01-03 | End: 2020-01-06

## 2020-01-03 RX ORDER — SULFAMETHOXAZOLE AND TRIMETHOPRIM 800; 160 MG/1; MG/1
1 TABLET ORAL 2 TIMES DAILY
Qty: 14 TABLET | Refills: 0 | Status: SHIPPED | OUTPATIENT
Start: 2020-01-03 | End: 2020-01-10

## 2020-01-03 RX ADMIN — LIDOCAINE HYDROCHLORIDE 10 ML: 10 INJECTION, SOLUTION INFILTRATION; PERINEURAL at 03:01

## 2020-01-03 NOTE — ED PROVIDER NOTES
Encounter Date: 1/3/2020       History     Chief Complaint   Patient presents with    Abscess     reports abscess to the right groin area for 2 days. denies drainage to abscess or fevers.      34-year-old female with presents the emergency room with a right groin abscess that has been there for few days.  Patient states that she went to see her doctor and he did not want to brad it because he said that he might hit the femoral artery.  Patient was very concerned about this and wanted to know if we would do further testing before I&D the abscess.  Patient denies any vaginal pain or vaginal discharge. Patient denies any fevers.    The history is provided by the patient.     Review of patient's allergies indicates:  No Known Allergies  No past medical history on file.  Past Surgical History:   Procedure Laterality Date    ANKLE SURGERY      SKIN GRAFT      on leg    SKIN GRAPH ON LEG      TUBAL LIGATION       No family history on file.  Social History     Tobacco Use    Smoking status: Current Every Day Smoker     Packs/day: 1.00    Smokeless tobacco: Never Used   Substance Use Topics    Alcohol use: No    Drug use: No     Review of Systems   Constitutional: Negative for fever.   HENT: Negative for sore throat.    Respiratory: Negative for shortness of breath.    Cardiovascular: Negative for chest pain.   Gastrointestinal: Negative for nausea.   Genitourinary: Negative for dysuria.   Musculoskeletal: Negative for back pain.   Skin: Positive for color change and wound. Negative for rash.   Neurological: Negative for weakness.   Hematological: Does not bruise/bleed easily.   All other systems reviewed and are negative.    Physical Exam     Initial Vitals [01/03/20 1325]   BP Pulse Resp Temp SpO2   122/64 93 20 98 °F (36.7 °C) 99 %      MAP       --         Physical Exam    Nursing note and vitals reviewed.  Constitutional: She appears well-developed and well-nourished.   HENT:   Head: Normocephalic and  atraumatic.   Eyes: Conjunctivae and EOM are normal. Pupils are equal, round, and reactive to light.   Cardiovascular: Normal rate, regular rhythm, normal heart sounds and intact distal pulses. Exam reveals no gallop and no friction rub.    No murmur heard.  Pulmonary/Chest: Breath sounds normal. No respiratory distress. She has no wheezes. She has no rhonchi. She has no rales. She exhibits no tenderness.   Abdominal: Soft. Bowel sounds are normal. She exhibits no distension and no mass. There is no tenderness. There is no rebound and no guarding.   Musculoskeletal: Normal range of motion. She exhibits no edema or tenderness.   Neurological: She is alert and oriented to person, place, and time. She has normal strength. GCS score is 15. GCS eye subscore is 4. GCS verbal subscore is 5. GCS motor subscore is 6.   Skin: Skin is warm. Abscess noted.            ED Course   I & D - Incision and Drainage  Date/Time: 1/3/2020 3:29 PM  Performed by: AARON Negrete  Authorized by: Samson Cosby MD   Consent Done: Yes  Consent: Verbal consent obtained.  Risks and benefits: risks, benefits and alternatives were discussed  Consent given by: patient  Patient identity confirmed: verbally with patient  Type: abscess  Body area: anogenital  Location details: perineum  Anesthesia: local infiltration    Anesthesia:  Local Anesthetic: lidocaine 1% without epinephrine  Anesthetic total: 2 mL  Description of findings: right groin abscess   Risk factor: coagulopathy  Scalpel size: 11  Incision type: single straight  Complexity: simple  Drainage: pus,  bloody and  purulent  Drainage amount: moderate  Wound treatment: incision,  drainage,  deloculation,  expression of material and  wound packed  Packing material: 1/4 in gauze  Complications: No  Estimated blood loss (mL): 1  Specimens: No  Implants: No  Patient tolerance: Patient tolerated the procedure well with no immediate complications        Labs Reviewed   POCT URINE  PREGNANCY           Medical Decision Making:   Initial Assessment:   34-year-old female with presents the emergency room with a right groin abscess that has been there for few days.   Differential Diagnosis:   Groin abscess, folliculitis, lymphadenitis  Clinical Tests:   Lab Tests: Ordered and Reviewed  The following lab test(s) were unremarkable: UPT  ED Management:  Patient examined noted to have a a groin abscess to the right groin.  I and D was completed.  See procedure note for details. Patient discharged on Bactrim and advised to follow up in 2-3 days for packing removal and wound check. Patient given strict return precautions and voiced understanding of all discharge instructions. Pt was stable at discharge.                        ED Course as of Jan 03 1720 Fri Jan 03, 2020   1426 BP: 122/64 [AT]   1426 Temp: 98 °F (36.7 °C) [AT]   1426 Temp src: Oral [AT]   1426 Pulse: 93 [AT]   1426 Resp: 20 [AT]   1426 SpO2: 99 % [AT]   1436 Preg Test, Ur: Negative [AT]      ED Course User Index  [AT] AARON Negrete                Clinical Impression:       ICD-10-CM ICD-9-CM   1. Groin abscess L02.214 682.2                             AARON Negrete  01/03/20 1720

## 2020-01-03 NOTE — ED TRIAGE NOTES
Pt presents to ED and reports abscess to the R inner thigh/groin area, pt states with onset x3 days. Pt states it started and a little bump and she then shaved the area and it thed developed as a abscess. Pt states pain is 10/10 at this time.

## 2020-01-06 ENCOUNTER — HOSPITAL ENCOUNTER (EMERGENCY)
Facility: HOSPITAL | Age: 35
Discharge: HOME | End: 2020-01-06
Attending: EMERGENCY MEDICINE
Payer: MEDICAID

## 2020-01-06 VITALS
RESPIRATION RATE: 20 BRPM | HEIGHT: 62 IN | SYSTOLIC BLOOD PRESSURE: 119 MMHG | HEART RATE: 87 BPM | WEIGHT: 155 LBS | OXYGEN SATURATION: 98 % | TEMPERATURE: 99 F | BODY MASS INDEX: 28.52 KG/M2 | DIASTOLIC BLOOD PRESSURE: 67 MMHG

## 2020-01-06 DIAGNOSIS — Z48.00 ABSCESS PACKING REMOVAL: Primary | ICD-10-CM

## 2020-01-06 PROCEDURE — 99282 EMERGENCY DEPT VISIT SF MDM: CPT

## 2020-01-06 NOTE — ED PROVIDER NOTES
Encounter Date: 1/6/2020    SCRIBE #1 NOTE: Phillip CONNER am scribing for, and in the presence of, AARON Barnhart.       History     Chief Complaint   Patient presents with    Wound Check     Pt had abscess lanced to right groin on Friday. Pt was told to come back and have packing removed.      Ivania King is a 34 y.o. female who has no past medical history on file.    The patient presents to the ED for wound check. Patient had an abscess to right groin lanced 3 days ago. She was told to return to the ER for packing removal. States it has been smelling bad and asks when she is able to shower. She has no other complaints.    The history is provided by the patient.     Review of patient's allergies indicates:  No Known Allergies  History reviewed. No pertinent past medical history.  Past Surgical History:   Procedure Laterality Date    ANKLE SURGERY      SKIN GRAFT      on leg    SKIN GRAPH ON LEG      TUBAL LIGATION       No family history on file.  Social History     Tobacco Use    Smoking status: Current Every Day Smoker     Packs/day: 1.00    Smokeless tobacco: Never Used   Substance Use Topics    Alcohol use: No    Drug use: No     Review of Systems   Constitutional: Negative for chills and fever.   HENT: Negative for sore throat.    Respiratory: Negative for cough, chest tightness, shortness of breath and stridor.    Cardiovascular: Negative for chest pain.   Gastrointestinal: Negative for abdominal pain, nausea and vomiting.   Genitourinary: Negative for dysuria.   Musculoskeletal: Negative for back pain and myalgias.   Skin: Positive for wound. Negative for rash.   Neurological: Negative for weakness.   Hematological: Does not bruise/bleed easily.   All other systems reviewed and are negative.    Physical Exam     Initial Vitals [01/06/20 1410]   BP Pulse Resp Temp SpO2   119/67 87 20 99.2 °F (37.3 °C) 98 %      MAP       --         Physical Exam    Nursing note and vitals  reviewed.  Constitutional: She appears well-developed and well-nourished.   HENT:   Head: Normocephalic and atraumatic.   Eyes: EOM are normal.   Neck: Normal range of motion. Neck supple.   Cardiovascular: Normal rate, regular rhythm, normal heart sounds and intact distal pulses. Exam reveals no gallop and no friction rub.    No murmur heard.  Pulmonary/Chest: Breath sounds normal. No respiratory distress. She has no wheezes. She has no rhonchi. She has no rales. She exhibits no tenderness.   Musculoskeletal: Normal range of motion. She exhibits no edema or tenderness.   Neurological: She is alert and oriented to person, place, and time. She has normal strength. GCS score is 15. GCS eye subscore is 4. GCS verbal subscore is 5. GCS motor subscore is 6.   Skin: Skin is warm and dry. There is erythema.   Small area of erythema where the previous abscess was located- packing in place without purulent drainage- cellulitis and erythema have decreased greatly from previous exam    Packing removed without difficulty- no retained purulence or exudate       ED Course   Procedures  Labs Reviewed - No data to display        Medical Decision Making:   Initial Assessment:   34-year-old with presents for packing removal from her previous abscess I and D  Differential Diagnosis:   Abscess, encounter for packing removal  ED Management:  Patient examined noted to have a healing abscess to the right groin region.  Packing removed without difficulty.  Patient states she has taken her medication as prescribed.  Patient advised to take all medication even if she begins to feel better.  Patient advised to return if she begins to have an increased area of erythema.  She has been advised not to soak in any water for extended periods of time for the next 48-72 hours. Patient given strict return precautions and voiced understanding of all discharge instructions. Pt was stable at discharge.                        ED Course as of Jan 06 1831    Mon Jan 06, 2020   1433 BP: 119/67 [AT]   1433 Temp: 99.2 °F (37.3 °C) [AT]   1433 Temp src: Oral [AT]   1433 Pulse: 87 [AT]   1433 Resp: 20 [AT]   1433 SpO2: 98 % [AT]      ED Course User Index  [AT] AARON Negrete            Clinical Impression:       ICD-10-CM ICD-9-CM   1. Abscess packing removal Z48.00 V58.30       Disposition:   Disposition: Discharged  Condition: Stable    This document was produced by a scribe under my direction and in my presence. I agree with the content of the note and have made any necessary edits.     Janie Momin DNP, FNP-BC                 AARON Negrete  01/06/20 1030

## 2020-01-06 NOTE — ED NOTES
Pt had packing placed with I & D on Friday to right groin.  Pt here to have packing removed and wound re-checked.  Denies fever or increased pain/swelling.  Denies any other complaints    APPEARANCE: Alert, oriented and in no acute distress.  CARDIAC: Normal rate and rhythm.   PERIPHERAL VASCULAR: peripheral pulses present. Normal cap refill. No edema. Warm to touch.    RESPIRATORY:Normal rate and effort. Respirations are equal and unlabored no obvious signs of distress.  MUSC: Full ROM. No bony tenderness or soft tissue tenderness. No obvious deformity.  SKIN: Skin is warm and dry, normal skin turgor, mucous membranes moist.  MENTAL STATUS: awake, alert and aware of environment.  EYE: PERRL, both eyes: pupils brisk and reactive to light. Normal size.  ENT: EARS: no obvious drainage. NOSE: no active bleeding.

## 2020-01-10 ENCOUNTER — PATIENT OUTREACH (OUTPATIENT)
Dept: EMERGENCY MEDICINE | Facility: HOSPITAL | Age: 35
End: 2020-01-10

## 2022-01-14 ENCOUNTER — PATIENT MESSAGE (OUTPATIENT)
Dept: ADMINISTRATIVE | Facility: OTHER | Age: 37
End: 2022-01-14
Payer: MEDICAID